# Patient Record
Sex: FEMALE | Race: BLACK OR AFRICAN AMERICAN | Employment: UNEMPLOYED | URBAN - METROPOLITAN AREA
[De-identification: names, ages, dates, MRNs, and addresses within clinical notes are randomized per-mention and may not be internally consistent; named-entity substitution may affect disease eponyms.]

---

## 2017-06-09 ENCOUNTER — HOSPITAL ENCOUNTER (OUTPATIENT)
Dept: MAMMOGRAPHY | Age: 62
Discharge: HOME | End: 2017-06-09

## 2017-06-09 ENCOUNTER — OFFICE VISIT (OUTPATIENT)
Dept: FAMILY PLANNING/WOMEN'S HEALTH CLINIC | Age: 62
End: 2017-06-09

## 2017-06-09 VITALS — SYSTOLIC BLOOD PRESSURE: 172 MMHG | DIASTOLIC BLOOD PRESSURE: 81 MMHG

## 2017-06-09 DIAGNOSIS — Z12.31 VISIT FOR SCREENING MAMMOGRAM: ICD-10-CM

## 2017-06-09 DIAGNOSIS — Z00.00 WELL WOMAN EXAM (NO GYNECOLOGICAL EXAM): Primary | ICD-10-CM

## 2017-06-09 PROCEDURE — 77067 SCR MAMMO BI INCL CAD: CPT

## 2017-06-09 NOTE — PROGRESS NOTES
Assessment/Plan:    Pt sent to [facility] for mammo and breast exam only  She is UTD on all other screening tests . Follow-up Disposition: Not on File    124 ALONDRA Barbosa expressed understanding of this plan. An AVS was printed and given to the patient.      ----------------------------------------------------------------------    Chief Complaint   Patient presents with    Well Woman     [facility] visit       History of Present Illness:  Here for breast exam. Last mammo with [facility] one year ago. No new breast problems since then. Post menopausal. We discussed SBE and how to perform it. Past Medical History:   Diagnosis Date    Diabetes ([facility]     Hypertension        Current Outpatient Prescriptions   Medication Sig Dispense Refill    folic acid (FOLVITE) 1 mg tablet Take  by mouth daily.  methotrexate (RHEUMATREX) 2.5 mg tablet Take  by mouth.  atorvastatin (LIPITOR) 20 mg tablet Take  by mouth daily.  losartan (COZAAR) 100 mg tablet Take 100 mg by mouth daily.  aspirin 81 mg chewable tablet Take 81 mg by mouth daily.  hydrochlorothiazide (MICROZIDE) 12.5 mg capsule Take 25 mg by mouth daily.  metformin (GLUCOPHAGE) 500 mg tablet Take 1,000 mg by mouth two (2) times daily (with meals).  ibuprofen (MOTRIN) 800 mg tablet Take 1 Tab by mouth every six (6) hours as needed for Pain. 40 Tab 0    sitaGLIPtin-metFORMIN (JANUMET) 50-1,000 mg per tablet Take 1 Tab by mouth two (2) times daily (with meals).  ROSUVASTATIN CALCIUM (CRESTOR PO) Take  by mouth.  VALSARTAN (DIOVAN PO) Take  by mouth. No Known Allergies    Social History   Substance Use Topics    Smoking status: Former Smoker    Smokeless tobacco: Never Used    Alcohol use No       No family history on file. Physical Exam:     Visit Vitals    /81  Comment: takes BP meds about 10       A&Ox3  WDWN NAD  Respirations normal and non labored  Breast exam- symmetrical ramona.  No nipple changes, no skin color changes, no dimpling or retractions, no masses felt

## 2017-06-09 NOTE — PROGRESS NOTES
EVERY WOMANS LIFE HISTORY QUESTIONNAIRE       No Yes Comments   Has a doctor ever seen or felt anything wrong with your breast? [x]                                  []                                     Have you ever had a breast biopsy? [x]                                  []                                          When and where was last mammogram performed? 6/2016    Have you ever been told that there was a problem on your mammogram?   No Yes Comments   [x]                                  []                                       Do you have breast implants? No Yes Comments   [x]                                  []                                       When was your last Pap test performed? 5/2017 with [facility]  Have you ever had an abnormal Pap test?   No Yes Comments   [x]                                  []                                       Have you had a hysterectomy? No Yes Comments (why)   [x]                                  []                                       Have you ever been diagnosed with any type of Cancer   No Yes Comments (type,when,where,type of treatment   [x]                                  []                                          Has a family member been diagnosed with breast or ovarian cancer? No Yes Comments (which family members, and type   [x]                                  []                                       Did your mother take SARIAH? No Yes Unknown   []                                  []                                  X     Do you have a history of HIV exposure? No Yes    [x]                                  []                                         Have you been through menopause?    No Yes Date of LMP   []                                  [x]                                  2007     Are you taking hormone replacement therapy (HRT)     No Yes Comments   [x]                                  []                                       How many times have you been pregnant? 4        Number of live births ? 4    Are you experiencing any of the following? No Yes Comments   Nipple Discharge [x]                                  []                                     Breast Lump/Masses []                                  []                                     Breast Skin Changes [x]                                  []                                          No Yes Comments   Vaginal Discharge [x]                                  []                                     Abnormal/unusual vaginal bleeding [x]                                  []                                         Are you experiencing any other health problems?   Rheumatoid Arth, HTN, diabetes

## 2017-10-30 ENCOUNTER — OFFICE VISIT (OUTPATIENT)
Dept: RHEUMATOLOGY | Age: 62
End: 2017-10-30

## 2017-10-30 VITALS
BODY MASS INDEX: 39.46 KG/M2 | HEIGHT: 61 IN | OXYGEN SATURATION: 98 % | WEIGHT: 209 LBS | TEMPERATURE: 97.5 F | SYSTOLIC BLOOD PRESSURE: 180 MMHG | RESPIRATION RATE: 16 BRPM | HEART RATE: 68 BPM | DIASTOLIC BLOOD PRESSURE: 95 MMHG

## 2017-10-30 DIAGNOSIS — M25.511 ACUTE PAIN OF RIGHT SHOULDER: ICD-10-CM

## 2017-10-30 DIAGNOSIS — J06.9 VIRAL UPPER RESPIRATORY ILLNESS: ICD-10-CM

## 2017-10-30 DIAGNOSIS — M06.9 RHEUMATOID ARTHRITIS WITH UNKNOWN RHEUMATOID FACTOR STATUS (HCC): Primary | ICD-10-CM

## 2017-10-30 DIAGNOSIS — Z79.60 LONG-TERM USE OF IMMUNOSUPPRESSANT MEDICATION: ICD-10-CM

## 2017-10-30 RX ORDER — LIDOCAINE HYDROCHLORIDE 10 MG/ML
1 INJECTION, SOLUTION EPIDURAL; INFILTRATION; INTRACAUDAL; PERINEURAL ONCE
Qty: 1 ML | Refills: 0
Start: 2017-10-30 | End: 2017-10-30

## 2017-10-30 RX ORDER — TRIAMCINOLONE ACETONIDE 40 MG/ML
40 INJECTION, SUSPENSION INTRA-ARTICULAR; INTRAMUSCULAR ONCE
Qty: 1 ML | Refills: 0
Start: 2017-10-30 | End: 2017-10-30

## 2017-10-30 NOTE — MR AVS SNAPSHOT
Visit Information Date & Time Provider Department Dept. Phone Encounter #  
 10/30/2017  9:00 AM Tan Lewis, [address] [place] [id number] Follow-up Instructions Return in about 2 weeks (around 11/13/2017). Upcoming Health Maintenance Date Due Hepatitis C Screening 1955 DTaP/Tdap/Td series (1 - Tdap) 5/16/1976 FOBT Q 1 YEAR AGE 50-75 5/16/2005 ZOSTER VACCINE AGE 60> 3/16/2015 PAP AKA CERVICAL CYTOLOGY 2/10/2017 INFLUENZA AGE 9 TO ADULT 8/1/2017 BREAST CANCER SCRN MAMMOGRAM 6/9/2019 Allergies as of 10/30/2017  Review Complete On: 10/30/2017 By: Layla Magaña LPN No Known Allergies Current Immunizations  Never Reviewed No immunizations on file. Not reviewed this visit You Were Diagnosed With   
  
 Codes Comments Rheumatoid arthritis with unknown rheumatoid factor status ([facility]    -  Primary ICD-10-CM: M06.9 ICD-9-CM: 714.0 Long-term use of immunosuppressant medication     ICD-10-CM: Z79.899 ICD-9-CM: V58.69 Acute pain of right shoulder     ICD-10-CM: M25.511 ICD-9-CM: 719.41 Vitals BP Pulse Temp Resp Height(growth percentile) Weight(growth percentile) (!) 180/95 (BP 1 Location: Right arm, BP Patient Position: Sitting) 68 97.5 °F (36.4 °C) (Oral) 16 5' 1\" (1.549 m) 209 lb (94.8 kg) SpO2 BMI OB Status Smoking Status 98% 39.49 kg/m2 Postmenopausal Former Smoker Vitals History BMI and BSA Data Body Mass Index Body Surface Area  
 39.49 kg/m 2 2.02 m 2 Preferred Pharmacy Pharmacy Name Phone [facility] [address] [place] [address] [phone] Your Updated Medication List  
  
   
This list is accurate as of: 10/30/17  9:32 AM.  Always use your most recent med list.  
  
  
  
  
 aspirin 81 mg chewable tablet Take 81 mg by mouth daily. CRESTOR PO Take  by mouth.   
  
 DIOVAN PO  
 Take  by mouth. folic acid 1 mg tablet Commonly known as:  Google Take  by mouth daily. ibuprofen 800 mg tablet Commonly known as:  MOTRIN Take 1 Tab by mouth every six (6) hours as needed for Pain.  
  
 lidocaine (PF) 10 mg/mL (1 %) injection Commonly known as:  XYLOCAINE  
1 mL by Intra artICUlar route once for 1 dose. Indications: ADMINISTRATION OF LOCAL ANESTHESIA  
  
 losartan 100 mg tablet Commonly known as:  COZAAR Take 100 mg by mouth daily. metFORMIN 500 mg tablet Commonly known as:  GLUCOPHAGE Take 1,000 mg by mouth two (2) times daily (with meals). methotrexate 2.5 mg tablet Commonly known as:  Yi Putt Take  by mouth Every Saturday. Takes 4 Tablets Every Saturday  
  
 triamcinolone acetonide 40 mg/mL injection Commonly known as:  KENALOG  
1 mL by IntraMUSCular route once for 1 dose. We Performed the Following C REACTIVE PROTEIN, QT [42810 CPT(R)] CBC WITH AUTOMATED DIFF [34776 CPT(R)] CHRONIC HEPATITIS PANEL [BKE1188 Custom] [address], IGG J861362 CPT(R)] METABOLIC PANEL, COMPREHENSIVE [62657 CPT(R)] PROTEIN ELECTROPHORESIS W/ REFLX MICHAELA [[id number] Custom] QUANTIFERON TB GOLD [[id number] Custom] RHEUMATOID FACTOR, QL M9531316 CPT(R)] SED RATE (ESR) O0087566 CPT(R)] TRIAMCINOLONE ACETONIDE INJ [ HCPCS] URIC ACID D077120 CPT(R)] VITAMIN D, 25 HYDROXY V6260838 CPT(R)] Follow-up Instructions Return in about 2 weeks (around 11/13/2017). To-Do List   
 10/30/2017 Imaging:  XR FOOT LT MIN 3 V   
  
 10/30/2017 Imaging:  XR FOOT RT MIN 3 V   
  
 10/30/2017 Imaging:  XR HAND LT MIN 3 V   
  
 10/30/2017 Imaging:  XR HAND RT MIN 3 V Patient Instructions Please continue to rest the joint for a few more days before resuming regular activities. It may be more painful for the first 1-2 days.  Watch for fever, or increased swelling or persistent pain in the joint. Call or return to clinic prn if such symptoms occur or there is failure to improve as anticipated.  
  
HOLD methotrexate until your cold resolves Introducing [facility]! Emily Paniagua introduces Matchbox patient portal. Now you can access parts of your medical record, email your doctor's office, and request medication refills online. 1. In your internet browser, go to [email or web address] 2. Click on the First Time User? Click Here link in the Sign In box. You will see the New Member Sign Up page. 3. Enter your Matchbox Access Code exactly as it appears below. You will not need to use this code after youve completed the sign-up process. If you do not sign up before the expiration date, you must request a new code. · Matchbox Access Code: [email or web address] Expires: 1/28/2018  9:32 AM 
 
4. Enter the last four digits of your Social Security Number (xxxx) and Date of Birth (mm/dd/yyyy) as indicated and click Submit. You will be taken to the next sign-up page. 5. Create a Matchbox ID. This will be your Matchbox login ID and cannot be changed, so think of one that is secure and easy to remember. 6. Create a Matchbox password. You can change your password at any time. 7. Enter your Password Reset Question and Answer. This can be used at a later time if you forget your password. 8. Enter your e-mail address. You will receive e-mail notification when new information is available in [address] 9. Click Sign Up. You can now view and download portions of your medical record. 10. Click the Download Summary menu link to download a portable copy of your medical information. If you have questions, please visit the Frequently Asked Questions section of the Matchbox website. Remember, Matchbox is NOT to be used for urgent needs. For medical emergencies, dial 911. Now available from your iPhone and Android! Please provide this summary of care documentation to your next provider. Your primary care clinician is listed as NONE. If you have any questions after today's visit, please call [phone].

## 2017-10-30 NOTE — PATIENT INSTRUCTIONS
Please continue to rest the joint for a few more days before resuming regular activities. It may be more painful for the first 1-2 days. Watch for fever, or increased swelling or persistent pain in the joint.  Call or return to clinic prn if such symptoms occur or there is failure to improve as anticipated.      HOLD methotrexate until your cold resolves

## 2017-10-30 NOTE — PROGRESS NOTES
REASON FOR VISIT    This is the initial evaluation for Ms. Jeanine Ortiz a 58 y.o.  female for question of an inflammatory arthritis. The patient is referred to the Arthritis and [address] [place] [address] at the request of Clearance DEONTE Rasmussen.     HISTORY OF PRESENT ILLNESS      I have reviewed and summarized old records from [facility]. In 2011, she has had pain in all her joints. She had hand pain, stiffness, and swelling associated with inability to make a fist, wash her face or perform work activities. Her stiffness would last 30 minutes. She a nurse assistance. She then saw her PCP, who diagnosed with Rheumatoid Arthritis, by Dr. Gabriel Multani, who referred her to rheumatology and was treated with methotrexate 12.5 mg weekly which was decreased to 10 mg weekly around 8.5 months due to cost.  Her joints felt better after 4 weeks and made pain free but she had swelling in her hands and right thumb. Today, she complains of right shoulder pain but she notes that it migrates from joint to joint. There is tenderness in her right thumb. She endorses stiffness for 30 minutes. She denies swelling. Her pain is worse in the evening. Her pain is aching and throbbing. She has not had injections. She does not want prednisone. She retired. Therapy History includes:    Current DMARD therapy includes: methotrexate 10 mg every Saturday  Prior DMARD therapy includes: none  The following DMARDs have been ineffective: none  The following DMARDs were stopped because of side effects: none    REVIEW OF SYSTEMS    A 15 point review of systems was performed and summarized below. The questionnaire was reviewed with the patient and scanned into the patient's medical record.     General: endorses night sweats, denies recent weight gain, recent weight loss, fatigue, weakness, fever  Musculoskeletal: endorses joint pain, joint swelling, muscle weakness, morning stiffness (lasting 30 minutes)  Ears: denies ringing in ears, loss of hearing, deafness  Eyes: endorses pain, blurred vision, denies redness, loss of vision, double vision, dryness, foreign body sensation  Mouth: denies sore tongue, oral ulcers, bleeding gums, loss of taste, dryness, increased dental caries  Nose: denies nosebleeds, loss of smell, nasal ulcers  Throat: endorses difficulty in swallowing, denies frequent sore throats, hoarseness, pain in jaw while chewing  Neck: denies swollen glands, tender glands  Cardiopulmonary: endorses swollen feet, denies pain in chest, irregular heart beat, sudden changes in heart beat, shortness of breath, difficulty breathing at night, cough, coughing of blood, wheezing  Gastrointestinal: endorses heartburn, denies nausea, stomach pain relieved by food, vomiting of blood/\"coffee grounds\", jaundice, increasing constipation, persistent diarrhea, blood in stools, black stools  Genitourinary: endorses getting up at night to pass urine, frequent urination,denies difficult urination, pain or burning on urination, blood in urine, cloudy urine, pus in urine, genital discharge,  vaginal dryness, rash/ulcers, sexual difficulties   Hematologic: denies anemia, bleeding tendency, blood clots  Skin: denies easy bruising, redness, rash, hives, sun sensitive, skin tightness, nodules/bumps, hair loss, color changes of hands or feet in the cold (Raynaud's)  Neurologic: endorses headaches, dizziness, denies fainting or loss of consciousness, numbness or tingling in hands/feet, memory loss, muscle weakness  Psychiatric: denies depression, excessive worries  Sleep: denies poor sleep, denies snoring, daytime somnolence, difficulty falling asleep, difficulty staying asleep     PAST MEDICAL HISTORY    She has a past medical history of Arthritis; Diabetes ([address] and Hypertension. FAMILY HISTORY    Her family history is not on file. SOCIAL HISTORY    She reports that she has quit smoking.  She has never used smokeless tobacco. She reports that she does not drink alcohol or use illicit drugs. HEALTH MAINTENANCE    Immunizations    There is no immunization history on file for this patient. MEDICATIONS    Current Outpatient Prescriptions   Medication Sig Dispense Refill    triamcinolone acetonide (KENALOG) 40 mg/mL injection 1 mL by IntraMUSCular route once for 1 dose. 1 mL 0    lidocaine, PF, (XYLOCAINE) 10 mg/mL (1 %) injection 1 mL by Intra artICUlar route once for 1 dose. Indications: ADMINISTRATION OF LOCAL ANESTHESIA 1 mL 0    folic acid (FOLVITE) 1 mg tablet Take  by mouth daily.  methotrexate (RHEUMATREX) 2.5 mg tablet Take  by mouth Every Saturday. Takes 4 Tablets Every Saturday      losartan (COZAAR) 100 mg tablet Take 100 mg by mouth daily.  ROSUVASTATIN CALCIUM (CRESTOR PO) Take  by mouth.  aspirin 81 mg chewable tablet Take 81 mg by mouth daily.  VALSARTAN (DIOVAN PO) Take  by mouth.  metformin (GLUCOPHAGE) 500 mg tablet Take 1,000 mg by mouth two (2) times daily (with meals).  ibuprofen (MOTRIN) 800 mg tablet Take 1 Tab by mouth every six (6) hours as needed for Pain. 40 Tab 0       ALLERGIES    No Known Allergies    PHYSICAL EXAMINATION    Visit Vitals    BP (!) 180/95 (BP 1 Location: Right arm, BP Patient Position: Sitting)    Pulse 68    Temp 97.5 °F (36.4 °C) (Oral)    Resp 16    Ht 5' 1\" (1.549 m)    Wt 209 lb (94.8 kg)    SpO2 98%    BMI 39.49 kg/m2     Body mass index is 39.49 kg/(m^2). General: Patient is alert, oriented x 3, not in acute distress    HEENT:   Conjunctiva are not injected and appear moist, oral mucous membranes are moist, there are no ulcers present, there is no alopecia, neck is supple, there is no lymphadenopathy. Salivary glands are normal    Cardiovascular:  Heart is regular rate and rhythm, no murmurs. Chest:  Lungs are clear to auscultation bilaterally.     Abdomen:  Soft, non-tender    Extremities:  Free of clubbing, cyanosis, edema, extremities well perfused. Neurological exam:  No focal sensory deficits, muscle strength is full in upper and lower extremities. Skin exam:  There are no rashes, no tophi, no psoriasis, no active Raynaud's, no livedo reticularis, no periungual erythema. Musculoskeletal exam:  A comprehensive musculoskeletal exam was performed for all joints of each upper and lower extremity and assessed for swelling, tenderness and range of motion. Pertinent results are documented as below:    Tenderness of right right shoulder with pain on active and passive ROM    Bilateral Venita and Heber[place] nodes. Bilateral knee crepitus without effusion. Z-Deformities:   no  [place] Deformities:  no  Boutonierre's Deformities:  no  Ulnar Deviation:   no  MCP Subluxation:  no    Joint Count 10/30/2017   Left wrist- Tender 1   Left wrist- Swollen 1   Left 1st MCP - Swollen 1   Left 2nd MCP - Swollen 1   Left 3rd MCP - Swollen 1   Left 4th MCP - Swollen 1   Left 5th MCP - Tender 1   Left 4th PIP - Tender 1   Right shoulder - Tender 1   Right wrist- Tender 1   Right wrist- Swollen 1   Right 1st MCP - Tender 1   Right 1st MCP - Swollen 1   Right 2nd MCP - Swollen 1   Right 3rd MCP - Swollen 1   Right 4th MCP - Swollen 1   Right 5th MCP - Swollen 1   Right thumb IP - Tender 1   Tender Joint Count (Total) 7   Swollen Joint Count (Total) 11       DATA REVIEW    Prior medical records were reviewed and are summarized as below:    Laboratory data: summarized in the HPI    Imaging: none     PROCEDURE     Indications:   Symptom relief from Right Shoulder Arthritis. (10/30/17)     Procedure:   After consent was obtained, and timeout performed, using sterile technique the Right shoulder joint was prepped using Chlorprep. Local anesthetic used: Ethyl Chloride. The joint was entered and 0 ml's of fluid was withdrawn. Kenalog 40 mg was mixed with 1% lidocaine 1 ml and injected into the joint and the needle withdrawn. The procedure was well tolerated. The patient was asked to continue to rest the joint for a few more days before resuming regular activities. It may be more painful for the first 1-2 days. Watch for fever, or increased swelling or persistent pain in the joint. Call or return to clinic prn if such symptoms occur or there is failure to improve as anticipated. ASSESSMENT AND PLAN    1) Rheumatoid Arthritis. She has a recent history of Rheumatoid Arthritis manifested by symmetric synovitis and was treated with methotrexate with good response regarding pain but not swelling or stiffness but her dose was reduced to 10 mg from 12.5 mg due to cost of medication. I discussed with her that I can substitute her tablet methotrexate to liquid formuation which is much cheaper than tablets but will need to evaluate about her renal function. I will check labs and radiographs today and have her follow up in 2 weeks to discuss. 2) Long Term Use of Immunosuppressants. The patient remains on immunomodulatory medications (methotrexate) and requires frequent toxicity monitoring by blood work. Respective labs were ordered (CBC and CMP). 3) Right Shoulder Pain. This injected with Kenalog 40 mg with good tolerance and response. 4) Upper Respiratory Tract Illness. I asked her to hold methotrexate until her illness resolves. The patient voiced understanding of the aforementioned assessment and plan. Summary of plan was provided in the After Visit Summary patient instructions. I also provided education about MyChart setup and utility.     TODAY'S ORDERS    Orders Placed This Encounter    QUANTIFERON TB GOLD    XR FOOT LT MIN 3 V    XR FOOT RT MIN 3 V    XR HAND LT MIN 3 V    XR HAND RT MIN 3 V    CYCLIC CITRUL PEPTIDE AB, IGG    CBC WITH AUTOMATED DIFF    CHRONIC HEPATITIS PANEL    METABOLIC PANEL, COMPREHENSIVE    C REACTIVE PROTEIN, QT    SED RATE (ESR)    RHEUMATOID FACTOR, QL    PROTEIN ELECTROPHORESIS W/ REFLX MICHAELA    URIC ACID    VITAMIN D, 25 HYDROXY    TRIAMCINOLONE ACETONIDE INJ    triamcinolone acetonide (KENALOG) 40 mg/mL injection    lidocaine, PF, (XYLOCAINE) 10 mg/mL (1 %) injection       Future Appointments  Date Time Provider Dexter Sue   11/22/2017 10:00 AM MD Sumanth Jiang MD, [address]    Adult Rheumatology   Musculoskeletal Ultrasound Certified  [address]   Phone [phone]  Fax [phone]

## 2017-11-01 LAB
25(OH)D3+25(OH)D2 SERPL-MCNC: 8.3 NG/ML (ref 30–100)
ALBUMIN SERPL ELPH-MCNC: 3.4 G/DL (ref 2.9–4.4)
ALBUMIN SERPL-MCNC: 3.9 G/DL (ref 3.6–4.8)
ALBUMIN/GLOB SERPL: 1 {RATIO} (ref 0.7–1.7)
ALBUMIN/GLOB SERPL: 1.3 {RATIO} (ref 1.2–2.2)
ALP SERPL-CCNC: 72 IU/L (ref 39–117)
ALPHA1 GLOB SERPL ELPH-MCNC: 0.2 G/DL (ref 0–0.4)
ALPHA2 GLOB SERPL ELPH-MCNC: 0.8 G/DL (ref 0.4–1)
ALT SERPL-CCNC: 15 IU/L (ref 0–32)
AST SERPL-CCNC: 21 IU/L (ref 0–40)
B-GLOBULIN SERPL ELPH-MCNC: 1.6 G/DL (ref 0.7–1.3)
BASOPHILS # BLD AUTO: 0 X10E3/UL (ref 0–0.2)
BASOPHILS NFR BLD AUTO: 0 %
BILIRUB SERPL-MCNC: 0.3 MG/DL (ref 0–1.2)
BUN SERPL-MCNC: 20 MG/DL (ref 8–27)
BUN/CREAT SERPL: 24 (ref 12–28)
CALCIUM SERPL-MCNC: 9.6 MG/DL (ref 8.7–10.3)
CCP IGA+IGG SERPL IA-ACNC: >250 UNITS (ref 0–19)
CHLORIDE SERPL-SCNC: 102 MMOL/L (ref 96–106)
CO2 SERPL-SCNC: 23 MMOL/L (ref 18–29)
COMMENT, 144067: NORMAL
CREAT SERPL-MCNC: 0.82 MG/DL (ref 0.57–1)
CRP SERPL-MCNC: 19.9 MG/L (ref 0–4.9)
EOSINOPHIL # BLD AUTO: 0.2 X10E3/UL (ref 0–0.4)
EOSINOPHIL NFR BLD AUTO: 2 %
ERYTHROCYTE [DISTWIDTH] IN BLOOD BY AUTOMATED COUNT: 15.9 % (ref 12.3–15.4)
ERYTHROCYTE [SEDIMENTATION RATE] IN BLOOD BY WESTERGREN METHOD: 73 MM/HR (ref 0–40)
GAMMA GLOB SERPL ELPH-MCNC: 0.8 G/DL (ref 0.4–1.8)
GFR SERPLBLD CREATININE-BSD FMLA CKD-EPI: 77 ML/MIN/1.73
GFR SERPLBLD CREATININE-BSD FMLA CKD-EPI: 89 ML/MIN/1.73
GLOBULIN SER CALC-MCNC: 3 G/DL (ref 1.5–4.5)
GLOBULIN SER CALC-MCNC: 3.5 G/DL (ref 2.2–3.9)
GLUCOSE SERPL-MCNC: 191 MG/DL (ref 65–99)
HBV CORE AB SERPL QL IA: NEGATIVE
HBV CORE IGM SERPL QL IA: NEGATIVE
HBV E AB SERPL QL IA: NEGATIVE
HBV E AG SERPL QL IA: NEGATIVE
HBV SURFACE AB SER QL: NON REACTIVE
HBV SURFACE AG SERPL QL IA: NEGATIVE
HCT VFR BLD AUTO: 27.7 % (ref 34–46.6)
HCV AB S/CO SERPL IA: <0.1 S/CO RATIO (ref 0–0.9)
HGB BLD-MCNC: 9 G/DL (ref 11.1–15.9)
IMM GRANULOCYTES # BLD: 0 X10E3/UL (ref 0–0.1)
IMM GRANULOCYTES NFR BLD: 0 %
LYMPHOCYTES # BLD AUTO: 1.3 X10E3/UL (ref 0.7–3.1)
LYMPHOCYTES NFR BLD AUTO: 21 %
M PROTEIN SERPL ELPH-MCNC: ABNORMAL G/DL
MCH RBC QN AUTO: 26.3 PG (ref 26.6–33)
MCHC RBC AUTO-ENTMCNC: 32.5 G/DL (ref 31.5–35.7)
MCV RBC AUTO: 81 FL (ref 79–97)
MONOCYTES # BLD AUTO: 0.4 X10E3/UL (ref 0.1–0.9)
MONOCYTES NFR BLD AUTO: 6 %
NEUTROPHILS # BLD AUTO: 4.4 X10E3/UL (ref 1.4–7)
NEUTROPHILS NFR BLD AUTO: 71 %
PLATELET # BLD AUTO: 457 X10E3/UL (ref 150–379)
PLEASE NOTE, 011150: ABNORMAL
POTASSIUM SERPL-SCNC: 4.3 MMOL/L (ref 3.5–5.2)
PROT PATTERN SERPL ELPH-IMP: ABNORMAL
PROT SERPL-MCNC: 6.9 G/DL (ref 6–8.5)
RBC # BLD AUTO: 3.42 X10E6/UL (ref 3.77–5.28)
RHEUMATOID FACT SERPL-ACNC: 94.9 IU/ML (ref 0–13.9)
SODIUM SERPL-SCNC: 140 MMOL/L (ref 134–144)
URATE SERPL-MCNC: 5.7 MG/DL (ref 2.5–7.1)
WBC # BLD AUTO: 6.4 X10E3/UL (ref 3.4–10.8)

## 2017-11-02 LAB
ANNOTATION COMMENT IMP: NORMAL
GAMMA INTERFERON BACKGROUND BLD IA-ACNC: 0.02 IU/ML
M TB IFN-G BLD-IMP: NEGATIVE
M TB IFN-G CD4+ BCKGRND COR BLD-ACNC: 0.01 IU/ML
M TB IFN-G CD4+ T-CELLS BLD-ACNC: 0.03 IU/ML
MITOGEN IGNF BLD-ACNC: 6.15 IU/ML
QUANTIFERON INCUBATION: NORMAL
SERVICE CMNT-IMP: NORMAL

## 2017-11-06 RX ORDER — ERGOCALCIFEROL 1.25 MG/1
50000 CAPSULE ORAL
Qty: 12 CAP | Refills: 3 | Status: SHIPPED | OUTPATIENT
Start: 2017-11-06 | End: 2018-11-06

## 2018-10-12 ENCOUNTER — HOSPITAL ENCOUNTER (OUTPATIENT)
Dept: MAMMOGRAPHY | Age: 63
Discharge: HOME | End: 2018-10-12

## 2018-10-12 ENCOUNTER — OFFICE VISIT (OUTPATIENT)
Dept: FAMILY PLANNING/WOMEN'S HEALTH CLINIC | Age: 63
End: 2018-10-12

## 2018-10-12 DIAGNOSIS — Z12.31 VISIT FOR SCREENING MAMMOGRAM: ICD-10-CM

## 2018-10-12 DIAGNOSIS — Z01.419 ENCOUNTER FOR WELL WOMAN EXAM: Primary | ICD-10-CM

## 2018-10-12 PROCEDURE — 77067 SCR MAMMO BI INCL CAD: CPT

## 2018-10-12 NOTE — PROGRESS NOTES
Assessment/Plan: 
 
Diagnoses and all orders for this visit: 1. Encounter for well woman exam 
 
 
 
Follow-up Disposition: Not on File ALONDRA Tejada expressed understanding of this plan. An AVS was printed and given to the patient.   
 
---------------------------------------------------------------------- No chief complaint on file. History of Present Illness: 
 Here for breast exam and mammo Had her last pap maybe a year ago at [facility]- we are going to confirm this Never had a colonoscopy Menopause \"years ago\" She does occasional SBE and has not found anything that she is concerned about She is in a safe relationship, no risk of DV Past Medical History:  
Diagnosis Date  Arthritis RA  Diabetes ([facility]  Hypertension Current Outpatient Prescriptions Medication Sig Dispense Refill  ergocalciferol (ERGOCALCIFEROL) 50,000 unit capsule Take 1 Cap by mouth every seven (7) days. Indications: VITAMIN D DEFICIENCY (HIGH DOSE THERAPY) 12 Cap 3  
 folic acid (FOLVITE) 1 mg tablet Take  by mouth daily.  methotrexate (RHEUMATREX) 2.5 mg tablet Take  by mouth Every Saturday. Takes 4 Tablets Every Saturday  losartan (COZAAR) 100 mg tablet Take 100 mg by mouth daily.  ROSUVASTATIN CALCIUM (CRESTOR PO) Take  by mouth.  aspirin 81 mg chewable tablet Take 81 mg by mouth daily.  VALSARTAN (DIOVAN PO) Take  by mouth.  metformin (GLUCOPHAGE) 500 mg tablet Take 1,000 mg by mouth two (2) times daily (with meals).  ibuprofen (MOTRIN) 800 mg tablet Take 1 Tab by mouth every six (6) hours as needed for Pain. 40 Tab 0 No Known Allergies Social History Substance Use Topics  Smoking status: Former Smoker  Smokeless tobacco: Never Used  Alcohol use No  
 
 
No family history on file. Physical Exam:  
 
There were no vitals taken for this visit. A&Ox3 WDWN NAD 
 Respirations normal and non labored Breast exam- neg for mass, dimpling, retractions, skin color changes, dimpling or retractions

## 2018-10-12 NOTE — PROGRESS NOTES
EVERY WOMANS LIFE HISTORY QUESTIONNAIRE No Yes Comments Has a doctor ever seen or felt anything wrong with your breast? [x]                                  []                                    
Have you ever had a breast biopsy? [x]                                  []                                    
  
 
When and where was last mammogram performed? 2017 [facility] Have you ever been told that there was a problem on your mammogram?  
No Yes Comments [x]                                  []                                    
 
Do you have breast implants? No Yes Comments [x]                                  []                                    
 
When was your last Pap test performed? June 2017 Have you ever had an abnormal Pap test?  
No Yes Comments []                                  [x]                                  2016  Last one in 2017 was normal  
 
Have you had a hysterectomy? No Yes Comments (why) [x]                                  []                                    
 
Have you ever been diagnosed with any type of Cancer No Yes Comments (type,when,where,type of treatment [x]                                  []                                    
  
 
Has a family member been diagnosed with breast or ovarian cancer? No Yes Comments (which family members, and type [x]                                  []                                    
 
Did your mother take SARIAH? No Yes Unknown  
[]                                  []                                  X  
 
Do you have a history of HIV exposure? No Yes   
[x]                                  []                                    
 
 
Have you been through menopause? No Yes Date of LMP []                                  [x]                                  Long time Are you taking hormone replacement therapy (HRT) No Yes Comments [x]                                  []                                    
 
How many times have you been pregnant? 4    Number of live births ? 2 Are you experiencing any of the following? No Yes Comments Nipple Discharge [x]                                  []                                    
Breast Lump/Masses [x]                                  []                                    
Breast Skin Changes [x]                                  []                                   
  
 
 No Yes Comments Vaginal Discharge [x]                                  []                                    
Abnormal/unusual vaginal bleeding [x]                                  []                                    
   
Are you experiencing any other health problems? Diabetes and arthrisis, HTN and High cholesterol

## 2019-10-21 ENCOUNTER — HOSPITAL ENCOUNTER (OUTPATIENT)
Dept: MAMMOGRAPHY | Age: 64
Discharge: HOME | End: 2019-10-21
Payer: MEDICARE

## 2019-10-21 DIAGNOSIS — Z12.31 ENCOUNTER FOR SCREENING MAMMOGRAM FOR MALIGNANT NEOPLASM OF BREAST: ICD-10-CM

## 2019-10-21 PROCEDURE — 77067 SCR MAMMO BI INCL CAD: CPT

## 2020-09-30 ENCOUNTER — TRANSCRIBE ORDER (OUTPATIENT)
Dept: SCHEDULING | Age: 65
End: 2020-09-30

## 2020-09-30 DIAGNOSIS — Z12.39 SCREENING FOR BREAST CANCER: Primary | ICD-10-CM

## 2020-12-02 ENCOUNTER — TRANSCRIBE ORDER (OUTPATIENT)
Dept: SCHEDULING | Age: 65
End: 2020-12-02

## 2020-12-02 DIAGNOSIS — Z12.31 SCREENING MAMMOGRAM FOR HIGH-RISK PATIENT: Primary | ICD-10-CM

## 2021-03-05 ENCOUNTER — HOSPITAL ENCOUNTER (OUTPATIENT)
Dept: INFUSION THERAPY | Age: 66
Discharge: HOME | End: 2021-03-05
Payer: MEDICARE

## 2021-03-05 VITALS
WEIGHT: 189.6 LBS | OXYGEN SATURATION: 100 % | DIASTOLIC BLOOD PRESSURE: 67 MMHG | RESPIRATION RATE: 18 BRPM | HEIGHT: 62 IN | TEMPERATURE: 97.4 F | BODY MASS INDEX: 34.89 KG/M2 | SYSTOLIC BLOOD PRESSURE: 142 MMHG | HEART RATE: 66 BPM

## 2021-03-05 PROCEDURE — 74011250636 HC RX REV CODE- 250/636: Performed by: INTERNAL MEDICINE

## 2021-03-05 PROCEDURE — 74011000258 HC RX REV CODE- 258: Performed by: INTERNAL MEDICINE

## 2021-03-05 PROCEDURE — 96365 THER/PROPH/DIAG IV INF INIT: CPT

## 2021-03-05 RX ADMIN — SODIUM CHLORIDE 125 MG: 9 INJECTION, SOLUTION INTRAVENOUS at 09:10

## 2021-03-05 NOTE — DISCHARGE INSTRUCTIONS
OUTPATIENT INFUSION CENTER    DISCHARGE INSTRUCTIONS FOR:    IRON INFUSIONS - INCLUDING VENOFER, FERRLECIT, AND INFED    You should continue to take your usual home medications unless otherwise instructed by your physician. Drink plenty of fluids and eat your usual diet. All medications have the potential to cause side effects. Your physician can instruct you regarding any necessary treatment for side effects. Some possible side effects of iron infusions may include the following:     - Urinary changes;   - Mild muscle cramping;   - Mild nausea, stomach pain, diarrhea or constipation;   - Mild skin itching, mild pain at IV site. Signs/Symptoms of an allergic reaction may require immediate medical attention. These may include one or more of the following:       Skin redness, itching, swelling, blistering, weeping, crusting, rash or hives. Wheezing, chest tightness, cough, or shortness of breath;   Swelling of the face, eyelids, lips, tongue, or throat;  Severe headache, seizures or tremor;  Stuffy nose, runny nose, sneezing;   Red (bloodshot), itchy, swollen, or watery eyes;  Stomach  pain, nausea, vomiting, diarrhea or bloody diarrhea; Chest pain or tightness, increased heart rate, palpitations, changes in blood pressure which can cause dizziness, unusual feelings of weakness or fatigue;  Back ache or pain around waist;  Painful urination, increase or decrease in amount of urine, blood in urine. Contact your physicians office with any questions or concerns regarding your treatment.     Kassy Dickerson, Signature: _____________________________________ 3/5/2021  Danielle Jimenez RN

## 2021-03-05 NOTE — PROGRESS NOTES
[facility] Progress Note                                 Date: March 5, 2021       Treatment: Ferrcelit      Ms. Bishop arrived ambulatory and in no acute distress at 0920    Patient COVID Screening completed:  Do you have any symptoms of COVID-19? SOB, coughing, fever, or generally not feeling well? NO  Have you been exposed to COVID-19 recently? NO  Have you had any recent contact with family/friend that has a pending COVID test? NO    Assessment was unremarkable with no new concerns voiced. 24G PIV established in  R A/C with positive blood return noted. Problem: Anemia Care Plan (Adult and Pediatric)  Goal: *Labs within defined limits  Outcome: Progressing Towards Goal     Problem: Knowledge Deficit  Goal: *Verbalizes understanding of procedures and medications  Outcome: Progressing Towards Goal     Problem: Patient Education:  Go to Education Activity  Goal: Patient/Family Education  Outcome: Progressing Towards Goal      Ms. Bishop's vitals for today's visit. Patient Vitals for the past 12 hrs:   Temp Pulse Resp BP SpO2   03/05/21 1132  66  (!) 142/67    03/05/21 0927 97.4 °F (36.3 °C) 63 18 (!) 173/70 100 %     No labs this visit. Medications given:  Medications Administered     ferric gluconate (FERRLECIT) 125 mg in 0.9% sodium chloride 100 mL, overfill volume 10 mL IVPB     Admin Date  03/05/2021 Action  New Bag Dose  125 mg Rate  120 mL/hr Route  IntraVENous Administered By  Chris Jalloh RN                Ms. Lissy Quiles tolerated the treatment without complaints. Patient was observed 30 minutes post infusion. PIV flushed and removed, 2x2 and coban placed.     Ms. Lissy Quiles was discharged from [place] [address] in stable condition at 1135    Future Appointments   Date Time Provider Dexter Rogers   4/2/2021 10:00  [facility] [address] 1 375 [facility]   5/7/2021  9:00 AM [address] [place] [address] Road 1 617 [place], RN  March 5, 2021  10:23 AM

## 2021-04-02 ENCOUNTER — HOSPITAL ENCOUNTER (OUTPATIENT)
Dept: INFUSION THERAPY | Age: 66
Discharge: HOME | End: 2021-04-02

## 2021-04-07 ENCOUNTER — HOSPITAL ENCOUNTER (OUTPATIENT)
Dept: INFUSION THERAPY | Age: 66
Discharge: HOME | End: 2021-04-07

## 2021-05-14 ENCOUNTER — HOSPITAL ENCOUNTER (OUTPATIENT)
Dept: INFUSION THERAPY | Age: 66
Discharge: HOME | End: 2021-05-14
Payer: MEDICARE

## 2021-05-14 VITALS
BODY MASS INDEX: 34.57 KG/M2 | HEART RATE: 65 BPM | DIASTOLIC BLOOD PRESSURE: 67 MMHG | SYSTOLIC BLOOD PRESSURE: 163 MMHG | OXYGEN SATURATION: 100 % | WEIGHT: 189 LBS | TEMPERATURE: 97.5 F | RESPIRATION RATE: 16 BRPM

## 2021-05-14 PROCEDURE — 74011250636 HC RX REV CODE- 250/636: Performed by: INTERNAL MEDICINE

## 2021-05-14 PROCEDURE — 96365 THER/PROPH/DIAG IV INF INIT: CPT

## 2021-05-14 PROCEDURE — 74011000258 HC RX REV CODE- 258: Performed by: INTERNAL MEDICINE

## 2021-05-14 RX ADMIN — SODIUM CHLORIDE 125 MG: 9 INJECTION, SOLUTION INTRAVENOUS at 09:32

## 2021-05-14 NOTE — PROGRESS NOTES
Outpatient Infusion Center Short Visit Progress Note    0900 Patient admitted to [facility] for Ferrlecit ambulatory in stable condition. Assessment completed. No new concerns voiced. Covid Screening      1. Do you have any symptoms of COVID-19? SOB, coughing, fever, or generally not feeling well ? NO  2. Have you been exposed to COVID-19 recently? NO  3. Have you had any recent contact with family/friend that has a pending COVID test? NO    Vital Signs:  Patient Vitals for the past 12 hrs:   Temp Pulse Resp BP SpO2   05/14/21 1127  65  (!) 163/67    05/14/21 1036  69  (!) 163/61    05/14/21 0905 97.5 °F (36.4 °C) 66 16 (!) 151/63 100 %         Peripheral IV 05/14/21 Right Forearm (Active)   Site Assessment Clean, dry, & intact 05/14/21 0900   Phlebitis Assessment 0 05/14/21 0900   Infiltration Assessment 0 05/14/21 0900   Dressing Status New 05/14/21 0900   Dressing Type Transparent 05/14/21 0900   Hub Color/Line Status Yellow; Flushed; Infusing 05/14/21 0900   Action Taken Open ports on tubing capped 05/14/21 0900   Alcohol Cap Used Yes 05/14/21 0900       Medications:  Medications Administered     ferric gluconate (FERRLECIT) 125 mg in 0.9% sodium chloride 100 mL, overfill volume 10 mL IVPB     Admin Date  05/14/2021 Action  New Bag Dose  125 mg Rate  120 mL/hr Route  IntraVENous Administered By  Jeanna STEPHEN              Patient PIV flushed and removed, bandage placed over site. Patient was monitored post infusion. No adverse reactios noted. Patient tolerated treatment well. Patient discharged from [facility] 58 ambulatory in no distress at 1130. Patient aware of next appointment.     Future Appointments   Date Time Provider Dexter Rogers   6/4/2021  9:00 [facility] 1 81 [facility]

## 2021-06-14 ENCOUNTER — HOSPITAL ENCOUNTER (OUTPATIENT)
Dept: INFUSION THERAPY | Age: 66
Discharge: HOME | End: 2021-06-14
Payer: MEDICARE

## 2021-06-14 VITALS
HEART RATE: 70 BPM | WEIGHT: 186 LBS | RESPIRATION RATE: 18 BRPM | BODY MASS INDEX: 34.02 KG/M2 | TEMPERATURE: 98.2 F | DIASTOLIC BLOOD PRESSURE: 42 MMHG | OXYGEN SATURATION: 100 % | SYSTOLIC BLOOD PRESSURE: 117 MMHG

## 2021-06-14 PROCEDURE — 74011250636 HC RX REV CODE- 250/636: Performed by: INTERNAL MEDICINE

## 2021-06-14 PROCEDURE — 96365 THER/PROPH/DIAG IV INF INIT: CPT

## 2021-06-14 PROCEDURE — 74011000258 HC RX REV CODE- 258: Performed by: INTERNAL MEDICINE

## 2021-06-14 RX ADMIN — SODIUM CHLORIDE 125 MG: 9 INJECTION, SOLUTION INTRAVENOUS at 11:40

## 2021-06-14 NOTE — PROGRESS NOTES
[facility] Short Note                       Date: 2021    Name: Leticia Dumont    MRN: [id number]         : 1955    Treatment: Katy MACARIO COVID-19 SCREENING      The patient was asked the following questions and answered as documented below:    1. Do you have any symptoms of COVID-19? SOB, coughing, fever, or generally not feeling well? NO  2. Have you been exposed to COVID-19 recently? NO  3. Have you had any recent contact with family/friend that has a pending COVID test? NO      Follow Up: Proceed with treatment    Ms. Regla Rordiguez was assessed and education was provided. 24 gauge IV placed to the RAC without difficulty. Problem: Patient Education:  Go to Education Activity  Goal: Patient/Family Education  Outcome: Progressing Towards Goal    Lines:   Peripheral IV 21 Right Antecubital (Active)   Site Assessment Clean, dry, & intact 21 1111   Phlebitis Assessment 0 21 1111   Infiltration Assessment 0 21 1111   Dressing Status Clean, dry, & intact;New;Occlusive 21 1111   Dressing Type Transparent 21 1111   Hub Color/Line Status Yellow; Flushed;Patent 21 1111        Ms. Bishop's vitals were reviewed prior to and after treatment. Patient Vitals for the past 12 hrs:   Temp Pulse Resp BP SpO2   21 1244  70  (!) 117/42    21 1104 98.2 °F (36.8 °C) (!) 59 18 (!) 159/65 100 %         Medications given:  Medications Administered       ferric gluconate (FERRLECIT) 125 mg in 0.9% sodium chloride 100 mL, overfill volume 10 mL IVPB       Admin Date  2021 Action  New Bag Dose  125 mg Rate  120 mL/hr Route  IntraVENous Administered By  Jose Esposito RN                    Ms. Regla Rodriguez tolerated the treatment without complaints. IV flushed and removed. Ms. Regla Rodriguez was discharged from [place] [address] in stable condition at 1310.       Patient provided with AVS , which includes future appointment and written educational material.     Future Appointments   Date Time Provider Dexter Rogers   6/21/2021  9:00  [facility] [address] COM   6/28/2021  9:00 AM [facility] INFUSION NURSE 2 RCROMARIO MACDONALD COM   7/6/2021  1:00 PM [facility] INFUSION NURSE 2 [address] COM   7/12/2021  9:00 AM [facility] INFUSION NURSE 2 [address] COM   7/19/2021  9:00 AM [facility] INFUSION NURSE 1 [address] COM   7/26/2021  9:00 AM [facility] INFUSION NURSE 1 [address] COM   8/2/2021  9:00 AM [facility] INFUSION NURSE 1 Juvencio Pillai RN  June 14, 2021  1:11 PM

## 2021-06-21 ENCOUNTER — HOSPITAL ENCOUNTER (OUTPATIENT)
Dept: INFUSION THERAPY | Age: 66
Discharge: HOME | End: 2021-06-21
Payer: MEDICARE

## 2021-06-21 VITALS
RESPIRATION RATE: 18 BRPM | TEMPERATURE: 97.5 F | SYSTOLIC BLOOD PRESSURE: 133 MMHG | DIASTOLIC BLOOD PRESSURE: 53 MMHG | HEART RATE: 62 BPM

## 2021-06-21 PROCEDURE — 96365 THER/PROPH/DIAG IV INF INIT: CPT

## 2021-06-21 PROCEDURE — 74011250636 HC RX REV CODE- 250/636: Performed by: INTERNAL MEDICINE

## 2021-06-21 PROCEDURE — 74011000258 HC RX REV CODE- 258: Performed by: INTERNAL MEDICINE

## 2021-06-21 RX ADMIN — SODIUM CHLORIDE 125 MG: 9 INJECTION, SOLUTION INTRAVENOUS at 09:55

## 2021-06-21 NOTE — PROGRESS NOTES
Outpatient Infusion Center Progress Note    0915  Pt arrived in stable condition for Ferrlecit    Assessment completed, patient has no new complaints. Patient denied having any symptoms of COVID-19, i.e. SOB, coughing, fever, or generally not feeling well. Also denies having been exposed to COVID-19 recently or having had any recent contact with family/friend that has a pending COVID test.     24G PIV established in [facility] without issue and positive blood return noted. Patient Vitals for the past 12 hrs:   Temp Pulse Resp BP   06/21/21 1113  62  (!) 133/53   06/21/21 0913 97.5 °F (36.4 °C) (!) 58 18 (!) 146/66        The following medications administered:  Medications Administered     ferric gluconate (FERRLECIT) 125 mg in 0.9% sodium chloride 100 mL, overfill volume 10 mL IVPB     Admin Date  06/21/2021 Action  New Bag Dose  125 mg Rate  120 mL/hr Route  IntraVENous Administered By  Rohit Christiansen RN                Pt tolerated treatment well. IV flushed per policy and removed, 2x2 and coban placed. Pt provided with education on possible side effects of medication along with discharge instructions. Pt verbalized understanding. 1115  Pt discharged in no acute distress.  Next appointment:    Future Appointments   Date Time Provider Dexter Rogers   6/28/2021  9:00 AM [facility] INFUSION NURSE 2 81 [facility] COM   7/6/2021  1:00 PM [facility] INFUSION NURSE 2 CATHLEEN MACDONALD [facility]   7/12/2021  9:00 AM [facility] INFUSION NURSE 2 CATHLEEN MACDONALD [facility]   7/19/2021  9:00 AM [facility] INFUSION NURSE 1 CATHLEEN MACDONALD [facility]   7/26/2021  9:00 AM [facility] INFUSION NURSE 1 CATHLEEN MACDONALD [facility]   8/2/2021  9:00 AM [facility] INFUSION NURSE 1 CATHLEEN MACDONALD [facility]

## 2021-06-28 ENCOUNTER — HOSPITAL ENCOUNTER (OUTPATIENT)
Dept: INFUSION THERAPY | Age: 66
Discharge: HOME | End: 2021-06-28
Payer: MEDICARE

## 2021-06-28 VITALS
OXYGEN SATURATION: 100 % | TEMPERATURE: 97.7 F | SYSTOLIC BLOOD PRESSURE: 116 MMHG | HEART RATE: 68 BPM | DIASTOLIC BLOOD PRESSURE: 57 MMHG | RESPIRATION RATE: 16 BRPM

## 2021-06-28 PROCEDURE — 96365 THER/PROPH/DIAG IV INF INIT: CPT

## 2021-06-28 PROCEDURE — 74011250636 HC RX REV CODE- 250/636: Performed by: INTERNAL MEDICINE

## 2021-06-28 PROCEDURE — 74011000258 HC RX REV CODE- 258: Performed by: INTERNAL MEDICINE

## 2021-06-28 RX ADMIN — SODIUM CHLORIDE 125 MG: 9 INJECTION, SOLUTION INTRAVENOUS at 11:08

## 2021-06-28 NOTE — DISCHARGE INSTRUCTIONS
OUTPATIENT INFUSION CENTER    DISCHARGE INSTRUCTIONS FOR:    IRON INFUSIONS - INCLUDING VENOFER, FERRLECIT, AND INFED    You should continue to take your usual home medications unless otherwise instructed by your physician. Drink plenty of fluids and eat your usual diet. All medications have the potential to cause side effects. Your physician can instruct you regarding any necessary treatment for side effects. Some possible side effects of iron infusions may include the following:     - Urinary changes;   - Mild muscle cramping;   - Mild nausea, stomach pain, diarrhea or constipation;   - Mild skin itching, mild pain at IV site. Signs/Symptoms of an allergic reaction may require immediate medical attention. These may include one or more of the following:       Skin redness, itching, swelling, blistering, weeping, crusting, rash or hives. Wheezing, chest tightness, cough, or shortness of breath;   Swelling of the face, eyelids, lips, tongue, or throat;  Severe headache, seizures or tremor;  Stuffy nose, runny nose, sneezing;   Red (bloodshot), itchy, swollen, or watery eyes;  Stomach  pain, nausea, vomiting, diarrhea or bloody diarrhea; Chest pain or tightness, increased heart rate, palpitations, changes in blood pressure which can cause dizziness, unusual feelings of weakness or fatigue;  Back ache or pain around waist;  Painful urination, increase or decrease in amount of urine, blood in urine. Contact your physicians office with any questions or concerns regarding your treatment.     Grace Fontana, Signature: _____________________________________ 6/28/2021  Dagmar De Leon RN

## 2021-06-28 NOTE — PROGRESS NOTES
[facility] Progress Note                                 Date: June 28, 2021       Treatment: Ferrlecit      Ms. Bishop arrived ambulatory and in no acute distress at 1030. Patient COVID Screening completed:   Do you have any symptoms of COVID-19? SOB, coughing, fever, or generally not feeling well? NO   Have you been exposed to COVID-19 recently? NO   Have you had any recent contact with family/friend that has a pending COVID test? NO    Assessment was unremarkable with no new concerns voiced. 24G PIV established in R A/C with positive blood return noted. Problem: Anemia Care Plan (Adult and Pediatric)  Goal: *Labs within defined limits  Outcome: Progressing Towards Goal  Goal: *Tolerates increased activity  Outcome: Progressing Towards Goal     Problem: Knowledge Deficit  Goal: *Verbalizes understanding of procedures and medications  Outcome: Progressing Towards Goal     Problem: Patient Education:  Go to Education Activity  Goal: Patient/Family Education  Outcome: Progressing Towards Goal     Problem: Patient Education:  Go to Education Activity  Goal: Patient/Family Education  Outcome: Progressing Towards Goal    Ms. Bishop's vitals for today's visit. Patient Vitals for the past 12 hrs:   Temp Pulse Resp BP SpO2   06/28/21 1225  68 16 (!) 116/57    06/28/21 1033 97.7 °F (36.5 °C) 62 18 (!) 156/67 100 %       No labs today    Medications given:  Medications Administered       ferric gluconate (FERRLECIT) 125 mg in 0.9% sodium chloride 100 mL, overfill volume 10 mL IVPB       Admin Date  06/28/2021 Action  New Bag Dose  125 mg Rate  120 mL/hr Route  IntraVENous Administered By  Eloy Muhammad RN                    Ms. Bonilla tolerated the treatment without complaints. Patient declined 30 minute post infusion observation. PIV flushed and removed, 2x2 and coban placed. Ms. Bonilla was discharged from [place] [address] in stable condition at 1230.      Future Appointments   Date Time Provider Dexter Rogers   7/6/2021  9:30  [address] INFUSION NURSE 2 [address] [facility]   7/12/2021  9:00  [address] INFUSION NURSE 2 [facility]   7/19/2021  9:00  [address] INFUSION NURSE 1 [facility]   7/26/2021  9:00  [address] INFUSION NURSE 1 [facility]   8/2/2021  9:00  [address] INFUSION NURSE 1 [address] [place] [address]       Jess Drew RN  June 28, 2021  11:47 AM

## 2021-07-06 ENCOUNTER — HOSPITAL ENCOUNTER (OUTPATIENT)
Dept: INFUSION THERAPY | Age: 66
Discharge: HOME | End: 2021-07-06
Payer: MEDICARE

## 2021-07-06 VITALS
OXYGEN SATURATION: 100 % | TEMPERATURE: 97.9 F | DIASTOLIC BLOOD PRESSURE: 63 MMHG | RESPIRATION RATE: 18 BRPM | HEART RATE: 59 BPM | WEIGHT: 187 LBS | BODY MASS INDEX: 34.2 KG/M2 | SYSTOLIC BLOOD PRESSURE: 125 MMHG

## 2021-07-06 PROCEDURE — 74011000258 HC RX REV CODE- 258: Performed by: INTERNAL MEDICINE

## 2021-07-06 PROCEDURE — 96365 THER/PROPH/DIAG IV INF INIT: CPT

## 2021-07-06 PROCEDURE — 74011250636 HC RX REV CODE- 250/636: Performed by: INTERNAL MEDICINE

## 2021-07-06 RX ADMIN — SODIUM CHLORIDE 125 MG: 9 INJECTION, SOLUTION INTRAVENOUS at 09:30

## 2021-07-06 NOTE — PROGRESS NOTES
[facility] Short Note                       Date: 2021    Name: Wandy Mendez    MRN: [id number]         : 1955    Treatment: Kaylen MACARIO COVID-19 SCREENING      The patient was asked the following questions and answered as documented below:    1. Do you have any symptoms of COVID-19? SOB, coughing, fever, or generally not feeling well? NO  2. Have you been exposed to COVID-19 recently? NO  3. Have you had any recent contact with family/friend that has a pending COVID test? NO      Follow Up: Proceed with treatment    Ms. Leopoldo Owens was assessed and education was provided. Problem: Anemia Care Plan (Adult and Pediatric)  Goal: *Labs within defined limits  Outcome: Progressing Towards Goal  Goal: *Tolerates increased activity  Outcome: Progressing Towards Goal     Problem: Patient Education:  Go to Education Activity  Goal: Patient/Family Education  Outcome: Progressing Towards Goal    Lines: 24 gauge IV placed to the RAC. Peripheral IV 21 Right Antecubital (Active)   Site Assessment Clean, dry, & intact 21 0855   Phlebitis Assessment 0 21 0855   Infiltration Assessment 0 21 0855   Dressing Status Clean, dry, & intact;New;Occlusive 21 0855   Dressing Type Transparent 21 0855   Hub Color/Line Status Yellow; Flushed;Patent 21 0855        Ms. Loza vitals were reviewed prior to and after treatment. Patient Vitals for the past 12 hrs:   Temp Pulse Resp BP SpO2   21 1032  (!) 59  125/63    21 0846 97.9 °F (36.6 °C) (!) 55 18 (!) 180/64 100 %       Medications given:  Medications Administered     ferric gluconate (FERRLECIT) 125 mg in 0.9% sodium chloride 100 mL, overfill volume 10 mL IVPB     Admin Date  2021 Action  New Bag Dose  125 mg Rate  120 mL/hr Route  IntraVENous Administered By  Williams Armijo RN                Ms. Leopoldo Owens tolerated the treatment without complaints.   Patient decline the recommended 30 min observation period. Ms. Felicia Almanza was discharged from [place] [address] in stable condition at [phone].       Patient provided with AVS , which includes future appointment and written educational material.     Future Appointments   Date Time Provider Dexter Rogers   7/12/2021  9:00  [facility] 2 [address] COM   7/19/2021  9:00 AM [address] [place] [address] [address] COM   7/26/2021  9:00  [facility] INFUSION NURSE 1 [address] COM   8/2/2021  9:00  [facility] INFUSION NURSE 1 Juvencio Cedeno 12, RN  July 6, 2021  10:37 AM

## 2021-07-06 NOTE — DISCHARGE INSTRUCTIONS
Patient Education   Sodium Ferric Gluconate Complex (By injection)   Sodium Ferric Gluconate Complex (NUBIA-patrick-um HERBERT-chito Nyur-bsv-nfen KOM-plex)  Treats iron deficiency anemia. Brand Name(s): Ferrlecit, PREMIERPro Rx Ferrlecit, Sodium Ferric Gluconate Complex Sucrose Novaplus   There may be other brand names for this medicine. When This Medicine Should Not Be Used: This medicine is not right for everyone. You should not receive it if you had an allergic reaction to sodium ferric gluconate. How to Use This Medicine:   Injectable  · Your doctor will prescribe your dose and schedule. This medicine is given through a needle placed in a vein. · A nurse or other health provider will give you this medicine. · Missed dose: This medicine needs to be given on a fixed schedule. If you miss a dose, call your doctor or dialysis clinic for instructions. Drugs and Foods to Avoid:   Ask your doctor or pharmacist before using any other medicine, including over-the-counter medicines, vitamins, and herbal products. · This medicine may reduce your body's absorption of oral iron supplements. Ask your doctor if you have questions about this. Warnings While Using This Medicine:   · Tell your doctor if you are pregnant or breastfeeding. · This medicine may cause low blood pressure for a few hours after it is given. · Your doctor will do lab tests at regular visits to check on the effects of this medicine. Keep all appointments.   Possible Side Effects While Using This Medicine:   Call your doctor right away if you notice any of these side effects:  · Allergic reaction: Itching or hives, swelling in your face or hands, swelling or tingling in your mouth or throat, chest tightness, trouble breathing  · Chest pain, trouble breathing  · Confusion, weakness, numbness or tingling in your hands, feet, or lips  · Fast, pounding, or uneven heartbeat  · Fever, chills, cough, sore throat, and body aches  · Lightheadedness, dizziness, or fainting  · Rapid weight gain, swelling in your hands, ankles, or feet  If you notice these less serious side effects, talk with your doctor:   · Headache or tiredness  · Leg cramps or pain  · Nausea, vomiting, diarrhea, loss of appetite  · Pain, itching, burning, swelling, or a lump under your skin where the needle is placed  If you notice other side effects that you think are caused by this medicine, tell your doctor. Call your doctor for medical advice about side effects. You may report side effects to FDA at 8-707-IFH-3287  © 2017 [facility] Information is for End User's use only and may not be sold, redistributed or otherwise used for commercial purposes. The above information is an  only. It is not intended as medical advice for individual conditions or treatments. Talk to your doctor, nurse or pharmacist before following any medical regimen to see if it is safe and effective for you.

## 2021-07-12 ENCOUNTER — HOSPITAL ENCOUNTER (OUTPATIENT)
Dept: INFUSION THERAPY | Age: 66
Discharge: HOME | End: 2021-07-12
Payer: MEDICARE

## 2021-07-12 VITALS
HEIGHT: 62 IN | SYSTOLIC BLOOD PRESSURE: 107 MMHG | HEART RATE: 58 BPM | OXYGEN SATURATION: 100 % | WEIGHT: 186 LBS | TEMPERATURE: 98.5 F | RESPIRATION RATE: 18 BRPM | BODY MASS INDEX: 34.23 KG/M2 | DIASTOLIC BLOOD PRESSURE: 57 MMHG

## 2021-07-12 PROCEDURE — 74011250636 HC RX REV CODE- 250/636: Performed by: INTERNAL MEDICINE

## 2021-07-12 PROCEDURE — 74011000258 HC RX REV CODE- 258: Performed by: INTERNAL MEDICINE

## 2021-07-12 PROCEDURE — 96365 THER/PROPH/DIAG IV INF INIT: CPT

## 2021-07-12 RX ORDER — GLUCOSAMINE SULFATE 1500 MG
POWDER IN PACKET (EA) ORAL DAILY
COMMUNITY

## 2021-07-12 RX ADMIN — SODIUM CHLORIDE 125 MG: 9 INJECTION, SOLUTION INTRAVENOUS at 09:07

## 2021-07-12 NOTE — DISCHARGE INSTRUCTIONS
OUTPATIENT INFUSION CENTER    DISCHARGE INSTRUCTIONS FOR:    IRON INFUSIONS - INCLUDING VENOFER, FERRLECIT, AND INFED    You should continue to take your usual home medications unless otherwise instructed by your physician. Drink plenty of fluids and eat your usual diet. All medications have the potential to cause side effects. Your physician can instruct you regarding any necessary treatment for side effects. Some possible side effects of iron infusions may include the following:     - Urinary changes;   - Mild muscle cramping;   - Mild nausea, stomach pain, diarrhea or constipation;   - Mild skin itching, mild pain at IV site. Signs/Symptoms of an allergic reaction may require immediate medical attention. These may include one or more of the following:       Skin redness, itching, swelling, blistering, weeping, crusting, rash or hives. Wheezing, chest tightness, cough, or shortness of breath;   Swelling of the face, eyelids, lips, tongue, or throat;  Severe headache, seizures or tremor;  Stuffy nose, runny nose, sneezing;   Red (bloodshot), itchy, swollen, or watery eyes;  Stomach  pain, nausea, vomiting, diarrhea or bloody diarrhea; Chest pain or tightness, increased heart rate, palpitations, changes in blood pressure which can cause dizziness, unusual feelings of weakness or fatigue;  Back ache or pain around waist;  Painful urination, increase or decrease in amount of urine, blood in urine. Contact your physicians office with any questions or concerns regarding your treatment.     Ana Rosa Calvo, Signature: _____________________________________ 7/12/2021  Tia Luis RN

## 2021-07-19 ENCOUNTER — HOSPITAL ENCOUNTER (OUTPATIENT)
Dept: INFUSION THERAPY | Age: 66
Discharge: HOME | End: 2021-07-19
Payer: MEDICARE

## 2021-07-19 VITALS
RESPIRATION RATE: 18 BRPM | OXYGEN SATURATION: 100 % | TEMPERATURE: 97.5 F | HEART RATE: 61 BPM | SYSTOLIC BLOOD PRESSURE: 134 MMHG | DIASTOLIC BLOOD PRESSURE: 64 MMHG

## 2021-07-19 PROCEDURE — 74011250636 HC RX REV CODE- 250/636: Performed by: INTERNAL MEDICINE

## 2021-07-19 PROCEDURE — 96365 THER/PROPH/DIAG IV INF INIT: CPT

## 2021-07-19 PROCEDURE — 74011000258 HC RX REV CODE- 258: Performed by: INTERNAL MEDICINE

## 2021-07-19 RX ADMIN — SODIUM CHLORIDE 125 MG: 9 INJECTION, SOLUTION INTRAVENOUS at 09:38

## 2021-07-19 NOTE — PROGRESS NOTES
[facility] Short Note                       Date: 2021    Name: Fortunato Murillo    MRN: [id number]         : 1955    Treatment: Amber Coughlin     [facility] COVID-19 SCREENING      The patient was asked the following questions and answered as documented below:    1. Do you have any symptoms of COVID-19? SOB, coughing, fever, or generally not feeling well? NO  2. Have you been exposed to COVID-19 recently? NO  3. Have you had any recent contact with family/friend that has a pending COVID test? NO      Follow Up: Proceed with treatment    Ms. Misty Grubbs was assessed and education was provided. Problem: Anemia Care Plan (Adult and Pediatric)  Goal: *Labs within defined limits  Outcome: Progressing Towards Goal  Goal: *Tolerates increased activity  Outcome: Progressing Towards Goal     Problem: Patient Education:  Go to Education Activity  Goal: Patient/Family Education  Outcome: Progressing Towards Goal    Lines: 24 gauge IV placed to the RAC  Peripheral IV 21 Right Antecubital (Active)   Site Assessment Clean, dry, & intact 21   Phlebitis Assessment 0 21   Infiltration Assessment 0 21   Dressing Status Clean, dry, & intact;New;Occlusive 21   Dressing Type Transparent 21   Hub Color/Line Status Yellow; Flushed;Patent 21        Ms. Loza vitals were reviewed prior to and after treatment. Patient Vitals for the past 12 hrs:   Temp Pulse Resp BP SpO2   21 1052  61  134/64    21 0911 97.5 °F (36.4 °C) (!) 58 18 (!) 173/69 100 %       Medications given:  Medications Administered     ferric gluconate (FERRLECIT) 125 mg in 0.9% sodium chloride 100 mL, overfill volume 10 mL IVPB     Admin Date  2021 Action  New Bag Dose  125 mg Rate  120 mL/hr Route  IntraVENous Administered By  Amado Webb RN                Ms. Misty Grubbs tolerated the treatment without complaints. Patient observed 30 mins post infusion. Ms. Dena Dailey was discharged from [place] [address] in stable condition at 1055.         Future Appointments   Date Time Provider Dexter Rogers   7/26/2021  9:00  [facility] INFUSION NURSE 1 81 [facility]   8/2/2021  9:00  [facility] INFUSION NURSE 1 375 [facility]       Luke Cruz RN  July 19, 2021  10:59 AM

## 2021-07-26 ENCOUNTER — HOSPITAL ENCOUNTER (OUTPATIENT)
Dept: INFUSION THERAPY | Age: 66
Discharge: HOME | End: 2021-07-26
Payer: MEDICARE

## 2021-07-26 VITALS
SYSTOLIC BLOOD PRESSURE: 142 MMHG | DIASTOLIC BLOOD PRESSURE: 69 MMHG | TEMPERATURE: 97.7 F | RESPIRATION RATE: 18 BRPM | HEART RATE: 66 BPM

## 2021-07-26 PROCEDURE — 74011250636 HC RX REV CODE- 250/636: Performed by: INTERNAL MEDICINE

## 2021-07-26 PROCEDURE — 74011000258 HC RX REV CODE- 258: Performed by: INTERNAL MEDICINE

## 2021-07-26 PROCEDURE — 96365 THER/PROPH/DIAG IV INF INIT: CPT

## 2021-07-26 RX ADMIN — SODIUM CHLORIDE 125 MG: 9 INJECTION, SOLUTION INTRAVENOUS at 10:01

## 2021-07-26 NOTE — PROGRESS NOTES
Outpatient Infusion Center Progress Note    0900  Pt arrived in stable condition for Ferrlecit 7/8    Assessment completed. Patient had elevated BP upon admission however she had just taken her BP--after 30 mins patients BP was within parameters to proceed. Patient denied having any symptoms of COVID-19, i.e. SOB, coughing, fever, or generally not feeling well. Also denies having been exposed to COVID-19 recently or having had any recent contact with family/friend that has a pending COVID test.     24G PIV established in [facility] without issue and positive blood return noted. Patient Vitals for the past 12 hrs:   Temp Pulse Resp BP   07/26/21 1122  66  (!) 142/69   07/26/21 0940  62  (!) 155/70   07/26/21 0902 97.7 °F (36.5 °C) 66 18 (!) 190/76        The following medications administered:  Medications Administered     ferric gluconate (FERRLECIT) 125 mg in 0.9% sodium chloride 100 mL, overfill volume 10 mL IVPB     Admin Date  07/26/2021 Action  New Bag Dose  125 mg Rate  120 mL/hr Route  IntraVENous Administered By  Lore Lyons RN                Pt tolerated treatment well, patient remained in [facility] for 30mins post infusion. IV flushed per policy and removed, 2x2 and coban placed. Pt provided with education on possible side effects of medication along with discharge instructions. Pt verbalized understanding. 1110  Pt discharged in no acute distress.  Next appointment:    Future Appointments   Date Time Provider Dexter Rogers   8/2/2021  9:00  [facility] INFUSION NURSE 1 81 [facility]

## 2021-08-02 ENCOUNTER — HOSPITAL ENCOUNTER (OUTPATIENT)
Dept: INFUSION THERAPY | Age: 66
Discharge: HOME | End: 2021-08-02
Payer: MEDICARE

## 2021-08-02 VITALS
DIASTOLIC BLOOD PRESSURE: 63 MMHG | RESPIRATION RATE: 18 BRPM | WEIGHT: 186 LBS | OXYGEN SATURATION: 100 % | HEART RATE: 66 BPM | BODY MASS INDEX: 34.02 KG/M2 | TEMPERATURE: 97.6 F | SYSTOLIC BLOOD PRESSURE: 145 MMHG

## 2021-08-02 PROCEDURE — 74011250636 HC RX REV CODE- 250/636: Performed by: INTERNAL MEDICINE

## 2021-08-02 PROCEDURE — 74011000258 HC RX REV CODE- 258: Performed by: INTERNAL MEDICINE

## 2021-08-02 PROCEDURE — 96365 THER/PROPH/DIAG IV INF INIT: CPT

## 2021-08-02 RX ADMIN — SODIUM CHLORIDE 125 MG: 9 INJECTION, SOLUTION INTRAVENOUS at 10:25

## 2021-08-02 NOTE — PROGRESS NOTES
[facility] Short Note                       Date: 2021    Name: Stacy Mckeon    MRN: [id number]         : 1955    Treatment: Khadijah PALACIOC COVID-19 SCREENING      The patient was asked the following questions and answered as documented below:    1. Do you have any symptoms of COVID-19? SOB, coughing, fever, or generally not feeling well? NO  2. Have you been exposed to COVID-19 recently? NO  3. Have you had any recent contact with family/friend that has a pending COVID test? NO      Follow Up: Proceed with treatment    Ms. Nikko Bruno was assessed and education was provided. Problem: Knowledge Deficit  Goal: *Verbalizes understanding of procedures and medications  Outcome: Progressing Towards Goal     Problem: Patient Education:  Go to Education Activity  Goal: Patient/Family Education  Outcome: Progressing Towards Goal    Lines: 24 gauge IV placed to the RAC without difficulty. Peripheral IV 21 Right Antecubital (Active)   Site Assessment Clean, dry, & intact 21   Phlebitis Assessment 0 21   Infiltration Assessment 0 21   Dressing Status Clean, dry, & intact;New;Occlusive 21   Dressing Type Transparent 21   Hub Color/Line Status Yellow; Flushed;Patent 21        Ms. Loza vitals were reviewed prior to and after treatment. Patient Vitals for the past 12 hrs:   Temp Pulse Resp BP SpO2   21 1212  66  (!) 145/63    21 1140  64  (!) 145/65    21 0901 97.6 °F (36.4 °C) (!) 54 18 (!) 155/71 100 %       Medications given:  Medications Administered       ferric gluconate (FERRLECIT) 125 mg in 0.9% sodium chloride 100 mL, overfill volume 10 mL IVPB       Admin Date  2021 Action  New Bag Dose  125 mg Rate  120 mL/hr Route  IntraVENous Administered By  Benitez Ordonez RN                    Ms. Nikko Bruno tolerated the treatment without complaints. Patient observed for 30 mins post infusion. Ms. Bonilla was discharged from [place] [address] in stable condition at 1215. Patient provided with AVS , which includes future appointment and written educational material.     No future appointments. Treatment complete.        Franchesca Odell RN  August 2, 2021  10:58 AM

## 2021-08-02 NOTE — DISCHARGE INSTRUCTIONS
Patient Education   Sodium Ferric Gluconate Complex (By injection)   Sodium Ferric Gluconate Complex (NUBIA-patrick-um HERBERT-chito Ilhs-fjh-ehhm KOM-plex)  Treats iron deficiency anemia. Brand Name(s): Ferrlecit, PREMIERPro Rx Ferrlecit, Sodium Ferric Gluconate Complex Sucrose Novaplus   There may be other brand names for this medicine. When This Medicine Should Not Be Used: This medicine is not right for everyone. You should not receive it if you had an allergic reaction to sodium ferric gluconate. How to Use This Medicine:   Injectable  · Your doctor will prescribe your dose and schedule. This medicine is given through a needle placed in a vein. · A nurse or other health provider will give you this medicine. · Missed dose: This medicine needs to be given on a fixed schedule. If you miss a dose, call your doctor or dialysis clinic for instructions. Drugs and Foods to Avoid:   Ask your doctor or pharmacist before using any other medicine, including over-the-counter medicines, vitamins, and herbal products. · This medicine may reduce your body's absorption of oral iron supplements. Ask your doctor if you have questions about this. Warnings While Using This Medicine:   · Tell your doctor if you are pregnant or breastfeeding. · This medicine may cause low blood pressure for a few hours after it is given. · Your doctor will do lab tests at regular visits to check on the effects of this medicine. Keep all appointments.   Possible Side Effects While Using This Medicine:   Call your doctor right away if you notice any of these side effects:  · Allergic reaction: Itching or hives, swelling in your face or hands, swelling or tingling in your mouth or throat, chest tightness, trouble breathing  · Chest pain, trouble breathing  · Confusion, weakness, numbness or tingling in your hands, feet, or lips  · Fast, pounding, or uneven heartbeat  · Fever, chills, cough, sore throat, and body aches  · Lightheadedness, dizziness, or fainting  · Rapid weight gain, swelling in your hands, ankles, or feet  If you notice these less serious side effects, talk with your doctor:   · Headache or tiredness  · Leg cramps or pain  · Nausea, vomiting, diarrhea, loss of appetite  · Pain, itching, burning, swelling, or a lump under your skin where the needle is placed  If you notice other side effects that you think are caused by this medicine, tell your doctor. Call your doctor for medical advice about side effects. You may report side effects to FDA at 7-922-EGZ-9221  © 2017 [facility] Information is for End User's use only and may not be sold, redistributed or otherwise used for commercial purposes. The above information is an  only. It is not intended as medical advice for individual conditions or treatments. Talk to your doctor, nurse or pharmacist before following any medical regimen to see if it is safe and effective for you.

## 2022-03-20 PROBLEM — Z79.60 LONG-TERM USE OF IMMUNOSUPPRESSANT MEDICATION: Status: ACTIVE | Noted: 2017-10-30

## 2023-03-15 ENCOUNTER — TRANSCRIBE ORDER (OUTPATIENT)
Dept: SCHEDULING | Age: 68
End: 2023-03-15

## 2023-03-15 DIAGNOSIS — Z12.31 VISIT FOR SCREENING MAMMOGRAM: Primary | ICD-10-CM

## 2023-03-17 ENCOUNTER — HOSPITAL ENCOUNTER (OUTPATIENT)
Dept: MAMMOGRAPHY | Age: 68
End: 2023-03-17
Attending: FAMILY MEDICINE
Payer: MEDICARE

## 2023-03-17 DIAGNOSIS — Z12.31 VISIT FOR SCREENING MAMMOGRAM: ICD-10-CM

## 2023-03-17 PROCEDURE — 77063 BREAST TOMOSYNTHESIS BI: CPT

## 2023-05-18 RX ORDER — LOSARTAN POTASSIUM 100 MG/1
100 TABLET ORAL DAILY
COMMUNITY

## 2023-05-18 RX ORDER — ASPIRIN 81 MG/1
81 TABLET, CHEWABLE ORAL DAILY
COMMUNITY

## 2023-05-18 RX ORDER — IBUPROFEN 800 MG/1
800 TABLET ORAL EVERY 6 HOURS PRN
COMMUNITY
Start: 2011-08-23

## 2023-05-18 RX ORDER — FOLIC ACID 1 MG/1
TABLET ORAL DAILY
COMMUNITY

## 2024-09-23 ENCOUNTER — TRANSCRIBE ORDERS (OUTPATIENT)
Facility: HOSPITAL | Age: 69
End: 2024-09-23

## 2024-09-23 DIAGNOSIS — M81.0 AGE-RELATED OSTEOPOROSIS WITHOUT CURRENT PATHOLOGICAL FRACTURE: ICD-10-CM

## 2024-09-23 DIAGNOSIS — N95.1 SYMPTOMATIC MENOPAUSAL OR FEMALE CLIMACTERIC STATES: Primary | ICD-10-CM
